# Patient Record
Sex: FEMALE | Race: WHITE | NOT HISPANIC OR LATINO | Employment: UNEMPLOYED | URBAN - METROPOLITAN AREA
[De-identification: names, ages, dates, MRNs, and addresses within clinical notes are randomized per-mention and may not be internally consistent; named-entity substitution may affect disease eponyms.]

---

## 2023-05-22 ENCOUNTER — TELEPHONE (OUTPATIENT)
Dept: PRIMARY CARE | Facility: CLINIC | Age: 48
End: 2023-05-22
Payer: MEDICAID

## 2023-05-24 PROBLEM — N92.1 MENOMETRORRHAGIA: Status: ACTIVE | Noted: 2023-05-24

## 2023-05-24 PROBLEM — L50.0 ALLERGIC URTICARIA: Status: ACTIVE | Noted: 2023-05-24

## 2023-05-24 PROBLEM — N92.1 METRORRHAGIA: Status: ACTIVE | Noted: 2023-05-24

## 2023-05-24 PROBLEM — K80.20 CHOLELITHIASIS: Status: ACTIVE | Noted: 2023-05-24

## 2023-05-24 PROBLEM — U07.1 COVID-19: Status: ACTIVE | Noted: 2023-05-24

## 2023-05-24 PROBLEM — N95.1 PERIMENOPAUSAL SYMPTOMS: Status: ACTIVE | Noted: 2023-05-24

## 2023-05-24 PROBLEM — L65.9 HAIR LOSS: Status: ACTIVE | Noted: 2023-05-24

## 2023-05-24 PROBLEM — H81.10 BENIGN PAROXYSMAL POSITIONAL VERTIGO: Status: ACTIVE | Noted: 2023-05-24

## 2023-05-24 PROBLEM — K82.0 OBSTRUCTION OF CYSTIC DUCT: Status: ACTIVE | Noted: 2023-05-24

## 2023-05-24 PROBLEM — N76.0 ACUTE VAGINITIS: Status: ACTIVE | Noted: 2023-05-24

## 2023-05-24 PROBLEM — R94.8 ABNORMAL BILIARY HIDA SCAN: Status: ACTIVE | Noted: 2023-05-24

## 2023-05-24 PROBLEM — L70.0 ACNE VULGARIS: Status: ACTIVE | Noted: 2023-05-24

## 2023-05-24 PROBLEM — B34.9 VIRAL SYNDROME: Status: ACTIVE | Noted: 2023-05-24

## 2023-05-24 PROBLEM — S29.012A STRAIN OF THORACIC SPINE: Status: ACTIVE | Noted: 2023-05-24

## 2023-05-24 PROBLEM — H60.501 ACUTE NON-INFECTIVE OTITIS EXTERNA OF RIGHT EAR: Status: ACTIVE | Noted: 2023-05-24

## 2023-05-24 PROBLEM — H61.23 BILATERAL IMPACTED CERUMEN: Status: ACTIVE | Noted: 2023-05-24

## 2023-05-24 PROBLEM — H61.893 IRRITATION OF BOTH EXTERNAL AUDITORY CANALS: Status: ACTIVE | Noted: 2023-05-24

## 2023-05-24 PROBLEM — R17 JAUNDICE: Status: ACTIVE | Noted: 2023-05-24

## 2023-05-24 PROBLEM — J30.9 ALLERGIC RHINITIS: Status: ACTIVE | Noted: 2023-05-24

## 2023-05-24 PROBLEM — R92.8 ABNORMAL MAMMOGRAM: Status: ACTIVE | Noted: 2023-05-24

## 2023-05-24 PROBLEM — K80.10 CALCULUS OF GALLBLADDER WITH CHRONIC CHOLECYSTITIS WITHOUT OBSTRUCTION: Status: ACTIVE | Noted: 2023-05-24
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
## 2023-05-24 RX ORDER — FLUTICASONE PROPIONATE 50 MCG
SPRAY, SUSPENSION (ML) NASAL
COMMUNITY
Start: 2022-12-29 | End: 2024-03-25 | Stop reason: WASHOUT

## 2023-05-24 RX ORDER — VITAMIN E (DL,TOCOPHERYL ACET) 180 MG
CAPSULE ORAL
COMMUNITY

## 2023-05-24 RX ORDER — BACLOFEN 20 MG
1 TABLET ORAL DAILY
COMMUNITY

## 2023-05-24 RX ORDER — POLYETHYLENE GLYCOL 3350 17 G/17G
POWDER, FOR SOLUTION ORAL
COMMUNITY

## 2023-05-24 RX ORDER — ASPIRIN 325 MG
TABLET ORAL
COMMUNITY

## 2023-05-24 RX ORDER — NORETHINDRONE ACETATE AND ETHINYL ESTRADIOL, AND FERROUS FUMARATE 1.5-30(21)
1 KIT ORAL DAILY
COMMUNITY

## 2023-05-24 RX ORDER — ACETAMINOPHEN AND CODEINE PHOSPHATE 300; 30 MG/1; MG/1
1 TABLET ORAL EVERY 6 HOURS
COMMUNITY
Start: 2022-08-31 | End: 2024-03-25 | Stop reason: WASHOUT

## 2023-05-30 ENCOUNTER — OFFICE VISIT (OUTPATIENT)
Dept: PRIMARY CARE | Facility: CLINIC | Age: 48
End: 2023-05-30
Payer: MEDICAID

## 2023-05-30 VITALS
BODY MASS INDEX: 21.94 KG/M2 | DIASTOLIC BLOOD PRESSURE: 68 MMHG | SYSTOLIC BLOOD PRESSURE: 112 MMHG | HEIGHT: 62 IN | WEIGHT: 119.2 LBS | OXYGEN SATURATION: 98 % | HEART RATE: 67 BPM | TEMPERATURE: 97.9 F

## 2023-05-30 DIAGNOSIS — H61.23 BILATERAL IMPACTED CERUMEN: Primary | ICD-10-CM

## 2023-05-30 PROCEDURE — 99213 OFFICE O/P EST LOW 20 MIN: CPT | Performed by: FAMILY MEDICINE

## 2023-05-30 PROCEDURE — 1036F TOBACCO NON-USER: CPT | Performed by: FAMILY MEDICINE

## 2023-05-30 SDOH — ECONOMIC STABILITY: HOUSING INSECURITY
IN THE LAST 12 MONTHS, WAS THERE A TIME WHEN YOU DID NOT HAVE A STEADY PLACE TO SLEEP OR SLEPT IN A SHELTER (INCLUDING NOW)?: NO

## 2023-05-30 SDOH — ECONOMIC STABILITY: FOOD INSECURITY: WITHIN THE PAST 12 MONTHS, THE FOOD YOU BOUGHT JUST DIDN'T LAST AND YOU DIDN'T HAVE MONEY TO GET MORE.: NEVER TRUE

## 2023-05-30 SDOH — ECONOMIC STABILITY: INCOME INSECURITY: IN THE LAST 12 MONTHS, WAS THERE A TIME WHEN YOU WERE NOT ABLE TO PAY THE MORTGAGE OR RENT ON TIME?: NO

## 2023-05-30 SDOH — ECONOMIC STABILITY: TRANSPORTATION INSECURITY
IN THE PAST 12 MONTHS, HAS THE LACK OF TRANSPORTATION KEPT YOU FROM MEDICAL APPOINTMENTS OR FROM GETTING MEDICATIONS?: NO

## 2023-05-30 SDOH — ECONOMIC STABILITY: FOOD INSECURITY: WITHIN THE PAST 12 MONTHS, YOU WORRIED THAT YOUR FOOD WOULD RUN OUT BEFORE YOU GOT MONEY TO BUY MORE.: NEVER TRUE

## 2023-05-30 SDOH — ECONOMIC STABILITY: TRANSPORTATION INSECURITY
IN THE PAST 12 MONTHS, HAS LACK OF TRANSPORTATION KEPT YOU FROM MEETINGS, WORK, OR FROM GETTING THINGS NEEDED FOR DAILY LIVING?: NO

## 2023-05-30 ASSESSMENT — PATIENT HEALTH QUESTIONNAIRE - PHQ9
SUM OF ALL RESPONSES TO PHQ9 QUESTIONS 1 & 2: 0
2. FEELING DOWN, DEPRESSED OR HOPELESS: NOT AT ALL
1. LITTLE INTEREST OR PLEASURE IN DOING THINGS: NOT AT ALL

## 2023-05-30 ASSESSMENT — LIFESTYLE VARIABLES
HOW OFTEN DO YOU HAVE SIX OR MORE DRINKS ON ONE OCCASION: NEVER
SKIP TO QUESTIONS 9-10: 1
HOW OFTEN DO YOU HAVE A DRINK CONTAINING ALCOHOL: NEVER
AUDIT-C TOTAL SCORE: 0
HOW MANY STANDARD DRINKS CONTAINING ALCOHOL DO YOU HAVE ON A TYPICAL DAY: PATIENT DOES NOT DRINK

## 2023-05-30 ASSESSMENT — SOCIAL DETERMINANTS OF HEALTH (SDOH)
WITHIN THE LAST YEAR, HAVE TO BEEN RAPED OR FORCED TO HAVE ANY KIND OF SEXUAL ACTIVITY BY YOUR PARTNER OR EX-PARTNER?: NO
WITHIN THE LAST YEAR, HAVE YOU BEEN HUMILIATED OR EMOTIONALLY ABUSED IN OTHER WAYS BY YOUR PARTNER OR EX-PARTNER?: NO
WITHIN THE LAST YEAR, HAVE YOU BEEN AFRAID OF YOUR PARTNER OR EX-PARTNER?: NO
HOW HARD IS IT FOR YOU TO PAY FOR THE VERY BASICS LIKE FOOD, HOUSING, MEDICAL CARE, AND HEATING?: NOT HARD AT ALL
WITHIN THE LAST YEAR, HAVE YOU BEEN KICKED, HIT, SLAPPED, OR OTHERWISE PHYSICALLY HURT BY YOUR PARTNER OR EX-PARTNER?: NO

## 2023-05-30 ASSESSMENT — PAIN SCALES - GENERAL: PAINLEVEL: 0-NO PAIN

## 2023-05-30 NOTE — PROGRESS NOTES
"Subjective   Patient ID: Nataly Bonilla is a 48 y.o. female who presents for c/o (Both ear blocked).    HPI     Review of Systems   HENT:  Positive for hearing loss.        Objective   /68 (BP Location: Left arm)   Pulse 67   Temp 36.6 °C (97.9 °F) (Temporal)   Ht 1.575 m (5' 2\")   Wt 54.1 kg (119 lb 3.2 oz)   SpO2 98%   BMI 21.80 kg/m²     Physical Exam  Vitals and nursing note reviewed.   HENT:      Ears:      Comments: Bilateral cerumen impaction removed        Assessment/Plan   Problem List Items Addressed This Visit          Other    Bilateral impacted cerumen - Primary          "

## 2023-10-27 ENCOUNTER — APPOINTMENT (OUTPATIENT)
Dept: PHYSICAL THERAPY | Facility: CLINIC | Age: 48
End: 2023-10-27
Payer: MEDICAID

## 2024-02-26 ENCOUNTER — TELEPHONE (OUTPATIENT)
Dept: PRIMARY CARE | Facility: CLINIC | Age: 49
End: 2024-02-26
Payer: MEDICAID

## 2024-02-26 NOTE — TELEPHONE ENCOUNTER
Pt. Does exercise classes & has pain w/ straightening & bending elbow only.  Has done icy hot-been feeling few days-left arm  Any suggestions on what to do for this?  Please advise  Antonio [phone]  Allergy to diphenhydramine hcl  Ty

## 2024-02-27 ENCOUNTER — HOSPITAL ENCOUNTER (OUTPATIENT)
Dept: RADIOLOGY | Facility: CLINIC | Age: 49
Discharge: HOME | End: 2024-02-27
Payer: MEDICAID

## 2024-02-27 DIAGNOSIS — Z12.31 ENCOUNTER FOR SCREENING MAMMOGRAM FOR MALIGNANT NEOPLASM OF BREAST: ICD-10-CM

## 2024-02-27 PROCEDURE — 77067 SCR MAMMO BI INCL CAD: CPT | Mod: BILATERAL PROCEDURE | Performed by: RADIOLOGY

## 2024-02-27 PROCEDURE — 77063 BREAST TOMOSYNTHESIS BI: CPT | Mod: BILATERAL PROCEDURE | Performed by: RADIOLOGY

## 2024-02-27 PROCEDURE — 77067 SCR MAMMO BI INCL CAD: CPT

## 2024-03-18 ENCOUNTER — TELEPHONE (OUTPATIENT)
Dept: PRIMARY CARE | Facility: CLINIC | Age: 49
End: 2024-03-18
Payer: MEDICAID

## 2024-03-18 DIAGNOSIS — N39.0 URINARY TRACT INFECTION WITHOUT HEMATURIA, SITE UNSPECIFIED: Primary | ICD-10-CM

## 2024-03-18 RX ORDER — NITROFURANTOIN 25; 75 MG/1; MG/1
100 CAPSULE ORAL 2 TIMES DAILY
Qty: 14 CAPSULE | Refills: 0 | Status: SHIPPED | OUTPATIENT
Start: 2024-03-18 | End: 2024-03-25

## 2024-03-25 ENCOUNTER — OFFICE VISIT (OUTPATIENT)
Dept: OTOLARYNGOLOGY | Facility: CLINIC | Age: 49
End: 2024-03-25
Payer: MEDICAID

## 2024-03-25 VITALS
HEART RATE: 64 BPM | SYSTOLIC BLOOD PRESSURE: 116 MMHG | BODY MASS INDEX: 22.45 KG/M2 | HEIGHT: 62 IN | DIASTOLIC BLOOD PRESSURE: 80 MMHG | WEIGHT: 122 LBS | TEMPERATURE: 98.7 F

## 2024-03-25 DIAGNOSIS — H61.23 EXCESSIVE CERUMEN IN BOTH EAR CANALS: Primary | ICD-10-CM

## 2024-03-25 DIAGNOSIS — H93.8X3 SENSATION OF PLUGGED EAR ON BOTH SIDES: ICD-10-CM

## 2024-03-25 PROBLEM — H61.20 IMPACTED CERUMEN: Status: ACTIVE | Noted: 2024-03-25

## 2024-03-25 PROBLEM — H91.91 HEARING LOSS OF RIGHT EAR: Status: ACTIVE | Noted: 2024-03-25

## 2024-03-25 PROBLEM — H93.8X9 IRRITATION OF EAR: Status: ACTIVE | Noted: 2023-05-24

## 2024-03-25 PROBLEM — R42 DIZZINESS: Status: ACTIVE | Noted: 2024-03-25

## 2024-03-25 PROBLEM — Z71.85 VACCINE COUNSELING: Status: ACTIVE | Noted: 2024-03-25

## 2024-03-25 PROBLEM — L70.9 ACNE: Status: ACTIVE | Noted: 2023-05-24

## 2024-03-25 PROBLEM — Z30.9 CONTRACEPTIVE MANAGEMENT: Status: ACTIVE | Noted: 2024-03-25

## 2024-03-25 PROBLEM — H60.90 OTITIS EXTERNA: Status: ACTIVE | Noted: 2024-03-25

## 2024-03-25 PROCEDURE — 1036F TOBACCO NON-USER: CPT | Performed by: NURSE PRACTITIONER

## 2024-03-25 PROCEDURE — 99213 OFFICE O/P EST LOW 20 MIN: CPT | Performed by: NURSE PRACTITIONER

## 2024-03-25 SDOH — ECONOMIC STABILITY: FOOD INSECURITY: WITHIN THE PAST 12 MONTHS, YOU WORRIED THAT YOUR FOOD WOULD RUN OUT BEFORE YOU GOT MONEY TO BUY MORE.: NEVER TRUE

## 2024-03-25 SDOH — ECONOMIC STABILITY: FOOD INSECURITY: WITHIN THE PAST 12 MONTHS, THE FOOD YOU BOUGHT JUST DIDN'T LAST AND YOU DIDN'T HAVE MONEY TO GET MORE.: NEVER TRUE

## 2024-03-25 ASSESSMENT — LIFESTYLE VARIABLES
AUDIT-C TOTAL SCORE: 0
HOW OFTEN DO YOU HAVE A DRINK CONTAINING ALCOHOL: NEVER
HOW OFTEN DO YOU HAVE SIX OR MORE DRINKS ON ONE OCCASION: NEVER
HOW MANY STANDARD DRINKS CONTAINING ALCOHOL DO YOU HAVE ON A TYPICAL DAY: PATIENT DOES NOT DRINK
SKIP TO QUESTIONS 9-10: 1

## 2024-03-25 ASSESSMENT — COLUMBIA-SUICIDE SEVERITY RATING SCALE - C-SSRS
2. HAVE YOU ACTUALLY HAD ANY THOUGHTS OF KILLING YOURSELF?: NO
1. IN THE PAST MONTH, HAVE YOU WISHED YOU WERE DEAD OR WISHED YOU COULD GO TO SLEEP AND NOT WAKE UP?: NO
6. HAVE YOU EVER DONE ANYTHING, STARTED TO DO ANYTHING, OR PREPARED TO DO ANYTHING TO END YOUR LIFE?: NO

## 2024-03-25 ASSESSMENT — ENCOUNTER SYMPTOMS
DEPRESSION: 0
LOSS OF SENSATION IN FEET: 0
OCCASIONAL FEELINGS OF UNSTEADINESS: 0

## 2024-03-25 ASSESSMENT — PATIENT HEALTH QUESTIONNAIRE - PHQ9
1. LITTLE INTEREST OR PLEASURE IN DOING THINGS: NOT AT ALL
SUM OF ALL RESPONSES TO PHQ9 QUESTIONS 1 AND 2: 0
2. FEELING DOWN, DEPRESSED OR HOPELESS: NOT AT ALL

## 2024-03-25 ASSESSMENT — PAIN SCALES - GENERAL: PAINLEVEL: 0-NO PAIN

## 2024-03-25 NOTE — PROGRESS NOTES
Subjective   Patient ID: Nataly Bonilla is a 48 y.o. female who presents for Cerumen Impaction.    HPI  INITIAL VISIT 9/28/2023:  Nataly Bonilla is a 48 year-old female here for evaluation of vertigo. This started about 1 month ago. When she turned from her right to her left side she had a spinning sensation. It only happens when she goes to the right side. The spinning is only a few seconds. Her mother has this. No headaches. No double vision. Bright lights and loud sounds do not cause dizziness. No pressure induced dizziness. No autophony.  No hearing loss. No tinnitus. No ear pain or drainage. She had ear tubes when she was younger. No loud noise exposure. No family history of hearing loss.      3/25/2024: Patient here for her ears feeling blocked up that started over the weekend. No pain.     Patient Active Problem List   Diagnosis    Abnormal biliary HIDA scan    Abnormal mammogram    Acne    Acute non-infective otitis externa of right ear    Acute vaginitis    Allergic rhinitis    Allergic urticaria    Benign paroxysmal positional vertigo    Bilateral impacted cerumen    Calculus of gallbladder with chronic cholecystitis without obstruction    Cholelithiasis    COVID-19    Loss of hair    Irritation of ear    Jaundice    Perimenopausal symptoms    Menorrhagia with irregular cycle    Metrorrhagia    Insect bite wound    Obstruction of cystic duct    Strain of thoracic spine    Viral infection    Dizziness    Vaccine counseling    Otitis externa    Impacted cerumen    Hearing loss of right ear    Contraceptive management     Past Surgical History:   Procedure Laterality Date    OTHER SURGICAL HISTORY  07/27/2021    Gallbladder surgery    OTHER SURGICAL HISTORY  12/21/2020    Colonoscopy     Review of Systems    All other systems have been reviewed and are negative for complaints except for those mentioned in history of present illness, past medical history and problem list.    Objective   Physical Exam    Right Ear:  External inspection of ear with no deformity, scars, or masses. Ear canal is with non-occluding cerumen that was removed using the microscope, suction, and alligator forceps. After removal, TM is intact with no sign of infection, effusion, or retraction.       Left Ear: External inspection of ear with no deformity, scars, or masses. Ear canal is with non-occluding cerumen that was removed using the microscope, suction, and alligator forceps. After removal, TM is intact with no sign of infection, effusion, or retraction.      Assessment/Plan   Diagnoses and all orders for this visit:  Excessive cerumen in both ear canals  Sensation of plugged ear on both sides  Ears were successfully cleaned. She may follow up in 6-12 months for repeat ear cleaning.   All questions answered to patient satisfaction.        KAREEM Anne-CNP 03/25/24 11:59 AM

## 2024-03-27 ENCOUNTER — APPOINTMENT (OUTPATIENT)
Dept: OTOLARYNGOLOGY | Facility: CLINIC | Age: 49
End: 2024-03-27
Payer: MEDICAID

## 2024-03-27 ENCOUNTER — OFFICE VISIT (OUTPATIENT)
Dept: OTOLARYNGOLOGY | Facility: CLINIC | Age: 49
End: 2024-03-27
Payer: MEDICAID

## 2024-03-27 VITALS — TEMPERATURE: 97.5 F | OXYGEN SATURATION: 100 % | HEART RATE: 64 BPM

## 2024-03-27 DIAGNOSIS — H93.8X1 PLUGGED FEELING IN EAR, RIGHT: Primary | ICD-10-CM

## 2024-03-27 PROCEDURE — 1036F TOBACCO NON-USER: CPT | Performed by: NURSE PRACTITIONER

## 2024-03-27 PROCEDURE — 99213 OFFICE O/P EST LOW 20 MIN: CPT | Performed by: NURSE PRACTITIONER

## 2024-03-27 ASSESSMENT — ENCOUNTER SYMPTOMS
LOSS OF SENSATION IN FEET: 0
OCCASIONAL FEELINGS OF UNSTEADINESS: 0
DEPRESSION: 0

## 2024-03-27 ASSESSMENT — PAIN SCALES - GENERAL: PAINLEVEL: 0-NO PAIN

## 2024-03-27 NOTE — PROGRESS NOTES
Subjective   Patient ID: Nataly Bonilla is a 48 y.o. female who presents for BLOCKED EAR (RIGHT).    HPI  INITIAL VISIT 9/28/2023:  Nataly Bonilla is a 48 year-old female here for evaluation of vertigo. This started about 1 month ago. When she turned from her right to her left side she had a spinning sensation. It only happens when she goes to the right side. The spinning is only a few seconds. Her mother has this. No headaches. No double vision. Bright lights and loud sounds do not cause dizziness. No pressure induced dizziness. No autophony.  No hearing loss. No tinnitus. No ear pain or drainage. She had ear tubes when she was younger. No loud noise exposure. No family history of hearing loss.      3/25/2024: Patient here for her ears feeling blocked up that started over the weekend. No pain.     3/27/2024: Patient's right ear is still feeling blocked.     Patient Active Problem List   Diagnosis    Abnormal biliary HIDA scan    Abnormal mammogram    Acne    Acute non-infective otitis externa of right ear    Acute vaginitis    Allergic rhinitis    Allergic urticaria    Benign paroxysmal positional vertigo    Bilateral impacted cerumen    Calculus of gallbladder with chronic cholecystitis without obstruction    Cholelithiasis    COVID-19    Loss of hair    Irritation of ear    Jaundice    Perimenopausal symptoms    Menorrhagia with irregular cycle    Metrorrhagia    Insect bite wound    Obstruction of cystic duct    Strain of thoracic spine    Viral infection    Dizziness    Vaccine counseling    Otitis externa    Impacted cerumen    Hearing loss of right ear    Contraceptive management     Past Surgical History:   Procedure Laterality Date    OTHER SURGICAL HISTORY  07/27/2021    Gallbladder surgery    OTHER SURGICAL HISTORY  12/21/2020    Colonoscopy     Review of Systems    All other systems have been reviewed and are negative for complaints except for those mentioned in history of present illness, past medical history  and problem list.    Objective   Physical Exam    Right Ear: External inspection of ear with no deformity, scars, or masses. EAC is clear There is mild wet cerumen on the TM that was removed using the microscope and 3F suction.  TM is intact with no sign of infection, effusion, or retraction.  No perforation seen.     Left Ear: External inspection of ear with no deformity, scars, or masses. EAC is clear.  TM is intact with no sign of infection, effusion, or retraction.  No perforation seen.      Assessment/Plan   Diagnoses and all orders for this visit:  Plugged right ear.  Mild cerumen removed. Recommend Ofloxacin drops for the next 5 days. Patient has some at home. Update next week.  All questions answered to patient satisfaction.       Kathleen Santana, KAREEM-CNP 03/27/24 2:12 PM

## 2024-07-03 DIAGNOSIS — Z30.41 ENCOUNTER FOR SURVEILLANCE OF CONTRACEPTIVE PILLS: Primary | ICD-10-CM

## 2024-07-09 RX ORDER — NORETHINDRONE ACETATE AND ETHINYL ESTRADIOL, AND FERROUS FUMARATE 1.5-30(21)
1 KIT ORAL DAILY
Qty: 84 TABLET | Refills: 0 | Status: SHIPPED | OUTPATIENT
Start: 2024-07-09

## 2024-07-24 ENCOUNTER — TELEPHONE (OUTPATIENT)
Dept: OBSTETRICS AND GYNECOLOGY | Facility: CLINIC | Age: 49
End: 2024-07-24
Payer: MEDICAID

## 2024-07-24 NOTE — TELEPHONE ENCOUNTER
Hi, Pt. Called and is going to be out of her birth control on Sunday.  She needs her Junel FE called into [facility] on [address].  [phone].  Thank you.

## 2024-07-25 DIAGNOSIS — Z30.41 ENCOUNTER FOR SURVEILLANCE OF CONTRACEPTIVE PILLS: ICD-10-CM

## 2024-07-25 RX ORDER — NORETHINDRONE ACETATE AND ETHINYL ESTRADIOL 1.5-30(21)
1 KIT ORAL DAILY
Qty: 84 TABLET | Refills: 3 | Status: CANCELLED | OUTPATIENT
Start: 2024-07-25

## 2024-07-25 NOTE — TELEPHONE ENCOUNTER
Prescription was sent in 7/09/2024 patient was made aware. Patient should have 3 packs. Patient has an appointment 08/15/2024 for annual exam.

## 2024-08-15 ENCOUNTER — APPOINTMENT (OUTPATIENT)
Dept: OBSTETRICS AND GYNECOLOGY | Facility: CLINIC | Age: 49
End: 2024-08-15
Payer: MEDICAID

## 2024-08-15 VITALS
DIASTOLIC BLOOD PRESSURE: 83 MMHG | HEIGHT: 62 IN | WEIGHT: 118.1 LBS | BODY MASS INDEX: 21.73 KG/M2 | SYSTOLIC BLOOD PRESSURE: 130 MMHG

## 2024-08-15 DIAGNOSIS — Z30.41 ENCOUNTER FOR SURVEILLANCE OF CONTRACEPTIVE PILLS: ICD-10-CM

## 2024-08-15 DIAGNOSIS — Z01.419 ENCOUNTER FOR ANNUAL ROUTINE GYNECOLOGICAL EXAMINATION: Primary | ICD-10-CM

## 2024-08-15 DIAGNOSIS — Z12.31 ENCOUNTER FOR SCREENING MAMMOGRAM FOR MALIGNANT NEOPLASM OF BREAST: ICD-10-CM

## 2024-08-15 PROCEDURE — 99396 PREV VISIT EST AGE 40-64: CPT | Performed by: OBSTETRICS & GYNECOLOGY

## 2024-08-15 PROCEDURE — 1036F TOBACCO NON-USER: CPT | Performed by: OBSTETRICS & GYNECOLOGY

## 2024-08-15 PROCEDURE — 3008F BODY MASS INDEX DOCD: CPT | Performed by: OBSTETRICS & GYNECOLOGY

## 2024-08-15 RX ORDER — NORETHINDRONE ACETATE AND ETHINYL ESTRADIOL 1.5-30(21)
1 KIT ORAL DAILY
Qty: 84 TABLET | Refills: 12 | Status: SHIPPED | OUTPATIENT
Start: 2024-08-15 | End: 2025-08-15

## 2024-08-15 NOTE — PROGRESS NOTES
Subjective   Patient ID: Nataly Bonilla is a 49 y.o. female who presents for Annual Exam.  HPI  Patient is a 49-year-old  0 para 0 white female whose last menstrual period was 2024.  Currently uses birth control pills for birth control and cycle regularity.  Would like to stay on them.  Her last menstrual period was  she said they are typically 1 month apart and she will bleed for about 2 days she admits to regular bowel movements denies any urinary symptoms.  Last normal Pap and HPV 2020 last normal mammogram 2024.  No new medical or health issues.  Review of Systems    Objective   Physical Exam  Gen.: Alert and in no acute distress. Well-developed, well-nourished.  Thyroid: Nonenlarged and no palpable thyroid nodules  Cardiovascular: Heart regular rate and rhythm  Pulmonary: Clear bilateral breath sounds  Breasts: Normal appearance, no nipple discharge and no skin changes. No palpable masses and no axillary lymphadenopathy  Abdomen: Soft and nontender, no abdominal mass palpated, no organomegaly   Pelvic: External genitalia normal, Bartholin's urethral and Patmos's glands normal. Vagina normal. Cervix normal. Uterus normal size and shape. Right adnexa normal without masses. Left adnexa normal without masses. Perianal area and normal.  Assessment/Plan   Annual GYN exam, Pap and HPV not needed this year, patient given a requisition for mammogram.  She will follow-up in 1 year or as needed.         Cresencio Hernandez MD 08/15/24 10:16 AM

## 2025-03-05 ENCOUNTER — HOSPITAL ENCOUNTER (OUTPATIENT)
Dept: RADIOLOGY | Facility: CLINIC | Age: 50
Discharge: HOME | End: 2025-03-05
Payer: MEDICAID

## 2025-03-05 VITALS — WEIGHT: 118.17 LBS | HEIGHT: 62 IN | BODY MASS INDEX: 21.75 KG/M2

## 2025-03-05 DIAGNOSIS — Z12.31 ENCOUNTER FOR SCREENING MAMMOGRAM FOR MALIGNANT NEOPLASM OF BREAST: ICD-10-CM

## 2025-03-05 PROCEDURE — 77067 SCR MAMMO BI INCL CAD: CPT | Performed by: RADIOLOGY

## 2025-03-05 PROCEDURE — 77067 SCR MAMMO BI INCL CAD: CPT

## 2025-03-05 PROCEDURE — 77063 BREAST TOMOSYNTHESIS BI: CPT | Performed by: RADIOLOGY

## 2025-03-12 ENCOUNTER — APPOINTMENT (OUTPATIENT)
Dept: OTOLARYNGOLOGY | Facility: CLINIC | Age: 50
End: 2025-03-12
Payer: MEDICAID

## 2025-03-12 ENCOUNTER — OFFICE VISIT (OUTPATIENT)
Dept: OTOLARYNGOLOGY | Facility: CLINIC | Age: 50
End: 2025-03-12
Payer: COMMERCIAL

## 2025-03-12 VITALS
HEART RATE: 64 BPM | TEMPERATURE: 98.2 F | BODY MASS INDEX: 21.53 KG/M2 | DIASTOLIC BLOOD PRESSURE: 92 MMHG | WEIGHT: 117 LBS | SYSTOLIC BLOOD PRESSURE: 135 MMHG | HEIGHT: 62 IN

## 2025-03-12 DIAGNOSIS — H61.23 BILATERAL IMPACTED CERUMEN: ICD-10-CM

## 2025-03-12 DIAGNOSIS — H93.8X1 PLUGGED FEELING IN EAR, RIGHT: Primary | ICD-10-CM

## 2025-03-12 PROCEDURE — 69210 REMOVE IMPACTED EAR WAX UNI: CPT | Performed by: NURSE PRACTITIONER

## 2025-03-12 PROCEDURE — 1036F TOBACCO NON-USER: CPT | Performed by: NURSE PRACTITIONER

## 2025-03-12 PROCEDURE — 69210 REMOVE IMPACTED EAR WAX UNI: CPT | Mod: 50 | Performed by: NURSE PRACTITIONER

## 2025-03-12 PROCEDURE — 3008F BODY MASS INDEX DOCD: CPT | Performed by: NURSE PRACTITIONER

## 2025-03-12 PROCEDURE — 99212 OFFICE O/P EST SF 10 MIN: CPT | Mod: 25 | Performed by: NURSE PRACTITIONER

## 2025-03-12 PROCEDURE — 99212 OFFICE O/P EST SF 10 MIN: CPT | Performed by: NURSE PRACTITIONER

## 2025-03-12 ASSESSMENT — PAIN SCALES - GENERAL: PAINLEVEL_OUTOF10: 0-NO PAIN

## 2025-03-12 ASSESSMENT — ENCOUNTER SYMPTOMS
DEPRESSION: 0
OCCASIONAL FEELINGS OF UNSTEADINESS: 0
LOSS OF SENSATION IN FEET: 0

## 2025-03-12 NOTE — PROGRESS NOTES
Subjective   Patient ID: Nataly Bonilla is a 49 y.o. female who presents for ear cleaning.    HPI  INITIAL VISIT 9/28/2023:  Nataly Bonilla is a 48 year-old female here for evaluation of vertigo. This started about 1 month ago. When she turned from her right to her left side she had a spinning sensation. It only happens when she goes to the right side. The spinning is only a few seconds. Her mother has this. No headaches. No double vision. Bright lights and loud sounds do not cause dizziness. No pressure induced dizziness. No autophony.  No hearing loss. No tinnitus. No ear pain or drainage. She had ear tubes when she was younger. No loud noise exposure. No family history of hearing loss.      3/25/2024: Patient here for her ears feeling blocked up that started over the weekend. No pain.     3/27/2024: Patient's right ear is still feeling blocked.     3/12/2025: Patient's ears feel blocked up. Denies ear pain.     Patient Active Problem List   Diagnosis    Abnormal biliary HIDA scan    Abnormal mammogram    Acne    Acute non-infective otitis externa of right ear    Acute vaginitis    Allergic rhinitis    Allergic urticaria    Benign paroxysmal positional vertigo    Bilateral impacted cerumen    Calculus of gallbladder with chronic cholecystitis without obstruction    Cholelithiasis    COVID-19    Loss of hair    Irritation of ear    Jaundice    Perimenopausal symptoms    Menorrhagia with irregular cycle    Metrorrhagia    Insect bite wound    Obstruction of cystic duct    Strain of thoracic spine    Viral infection    Dizziness    Vaccine counseling    Otitis externa    Impacted cerumen    Hearing loss of right ear    Contraceptive management     Past Surgical History:   Procedure Laterality Date    OTHER SURGICAL HISTORY  07/27/2021    Gallbladder surgery    OTHER SURGICAL HISTORY  12/21/2020    Colonoscopy     Review of Systems    All other systems have been reviewed and are negative for complaints except for those  mentioned in history of present illness, past medical history and problem list.    Objective   Physical Exam    Right Ear: External inspection of ear with no deformity, scars, or masses. EAC is impacted with cerumen, TM not visible.     Left Ear: External inspection of ear with no deformity, scars, or masses. EAC is impacted with cerumen, TM not visible.     Procedure: Cerumen Removal  Indication: Cerumen Impaction  Risks, benefits, alternatives, and expectations discussed with patient and patient wishes to proceed.    Bilateral canals with cerumen impaction.  Using the microscope and suction, large amounts of soft brown cerumen removed bilaterally. Both TMs intact. No effusions or retractions noted.  Patient tolerated procedure well.     Assessment/Plan   Diagnoses and all orders for this visit:  Clogged ears, bilateral  Cerumen impaction, bilateral   Ears were successfully cleaned. Follow up in 1 year for repeat ear cleaning.    All questions answered to patient satisfaction.        KAREEM Anne-CNP 03/12/25 11:10 AM

## 2025-09-04 ENCOUNTER — APPOINTMENT (OUTPATIENT)
Dept: OBSTETRICS AND GYNECOLOGY | Facility: CLINIC | Age: 50
End: 2025-09-04
Payer: MEDICAID

## 2025-10-28 ENCOUNTER — APPOINTMENT (OUTPATIENT)
Dept: OBSTETRICS AND GYNECOLOGY | Facility: CLINIC | Age: 50
End: 2025-10-28
Payer: MEDICAID